# Patient Record
Sex: FEMALE | Race: WHITE | Employment: FULL TIME | URBAN - METROPOLITAN AREA
[De-identification: names, ages, dates, MRNs, and addresses within clinical notes are randomized per-mention and may not be internally consistent; named-entity substitution may affect disease eponyms.]

---

## 2020-06-28 ENCOUNTER — HOSPITAL ENCOUNTER (EMERGENCY)
Age: 30
Discharge: HOME | End: 2020-06-28
Attending: STUDENT IN AN ORGANIZED HEALTH CARE EDUCATION/TRAINING PROGRAM
Payer: COMMERCIAL

## 2020-06-28 VITALS
DIASTOLIC BLOOD PRESSURE: 77 MMHG | WEIGHT: 135 LBS | OXYGEN SATURATION: 98 % | BODY MASS INDEX: 28.34 KG/M2 | RESPIRATION RATE: 15 BRPM | SYSTOLIC BLOOD PRESSURE: 112 MMHG | TEMPERATURE: 98.6 F | HEART RATE: 64 BPM | HEIGHT: 58 IN

## 2020-06-28 PROCEDURE — 99282 EMERGENCY DEPT VISIT SF MDM: CPT

## 2020-06-28 PROCEDURE — 12011 RPR F/E/E/N/L/M 2.5 CM/<: CPT

## 2020-06-28 PROCEDURE — 6360000002 HC RX W HCPCS: Performed by: PHYSICIAN ASSISTANT

## 2020-06-28 PROCEDURE — 90471 IMMUNIZATION ADMIN: CPT | Performed by: PHYSICIAN ASSISTANT

## 2020-06-28 PROCEDURE — 90715 TDAP VACCINE 7 YRS/> IM: CPT | Performed by: PHYSICIAN ASSISTANT

## 2020-06-28 RX ORDER — ESCITALOPRAM OXALATE 20 MG/1
20 TABLET ORAL DAILY
COMMUNITY

## 2020-06-28 RX ORDER — AMOXICILLIN AND CLAVULANATE POTASSIUM 875; 125 MG/1; MG/1
1 TABLET, FILM COATED ORAL 2 TIMES DAILY
Qty: 14 TABLET | Refills: 0 | Status: SHIPPED | OUTPATIENT
Start: 2020-06-28 | End: 2020-07-05

## 2020-06-28 RX ADMIN — TETANUS TOXOID, REDUCED DIPHTHERIA TOXOID AND ACELLULAR PERTUSSIS VACCINE, ADSORBED 0.5 ML: 5; 2.5; 8; 8; 2.5 SUSPENSION INTRAMUSCULAR at 10:34

## 2020-06-28 ASSESSMENT — ENCOUNTER SYMPTOMS: COLOR CHANGE: 0

## 2020-06-28 ASSESSMENT — PAIN SCALES - GENERAL: PAINLEVEL_OUTOF10: 2

## 2020-06-28 NOTE — ED PROVIDER NOTES
well-developed. She is not toxic-appearing or diaphoretic. HENT:      Head: Normocephalic. Jaw: There is normal jaw occlusion. Right Ear: External ear normal.      Left Ear: External ear normal.      Nose: Nose normal.      Mouth/Throat:      Mouth: Mucous membranes are moist.     Eyes:      General:         Right eye: No discharge. Left eye: No discharge. Conjunctiva/sclera: Conjunctivae normal.   Neck:      Musculoskeletal: Normal range of motion and neck supple. Cardiovascular:      Rate and Rhythm: Normal rate. Pulmonary:      Effort: Pulmonary effort is normal.   Musculoskeletal: Normal range of motion. Skin:     General: Skin is warm and dry. Coloration: Skin is not pale. Neurological:      Mental Status: She is alert and oriented to person, place, and time. Psychiatric:         Behavior: Behavior normal. Behavior is cooperative. MEDICAL DECISION MAKING    Vitals:    Vitals:    06/28/20 1031   BP: 112/77   Pulse: 64   Resp: 15   Temp: 98.6 °F (37 °C)   TempSrc: Oral   SpO2: 98%   Weight: 135 lb (61.2 kg)   Height: 4' 10\" (1.473 m)       LABS: Labs Reviewed - No data to display     Remainder of labs reviewed and were negative at this time or not returned at the time of this note. RADIOLOGY:   Non-plain film images suchas CT, Ultrasound and MRI are read by the radiologist. Laury MORROW PA have directly visualized the radiologic plain film image(s) with the below findings:    Interpretation per the Radiologist below, if available at the time of this note:    No orders to display     [facility] / ED COURSE:      PROCEDURES:   Lac Repair  Date/Time: 6/28/2020 1:14 PM  Performed by: Blondell Mcardle, PA  Authorized by: Elsa Santiago MD     Consent:     Consent obtained:  Verbal    Consent given by:  Patient    Risks discussed:  Infection, pain, poor cosmetic result and poor wound healing  Anesthesia (see MAR for exact dosages):      Anesthesia

## 2020-06-29 ENCOUNTER — CARE COORDINATION (OUTPATIENT)
Dept: OTHER | Facility: CLINIC | Age: 30
End: 2020-06-29

## 2020-11-25 ENCOUNTER — HOSPITAL ENCOUNTER (OUTPATIENT)
Age: 30
Setting detail: SPECIMEN
Discharge: HOME | End: 2020-11-25
Payer: COMMERCIAL

## 2020-11-25 LAB — SARS-COV-2, NAAT: NOT DETECTED

## 2020-11-25 PROCEDURE — U0002 COVID-19 LAB TEST NON-CDC: HCPCS

## 2020-12-09 ENCOUNTER — HOSPITAL ENCOUNTER (OUTPATIENT)
Age: 30
Setting detail: SPECIMEN
Discharge: HOME | End: 2020-12-09
Payer: COMMERCIAL

## 2020-12-09 LAB — SARS-COV-2, NAAT: NOT DETECTED

## 2020-12-09 PROCEDURE — U0002 COVID-19 LAB TEST NON-CDC: HCPCS
